# Patient Record
(demographics unavailable — no encounter records)

---

## 2025-07-14 NOTE — DISCUSSION/SUMMARY
[FreeTextEntry1] : 47 year old with fibroid uterus and anemia, recent MRI cannot r/o leiomyosarcoma  I discussed management options with Ms. FRYE in detail.    The risk of uterine sarcoma in women undergoing hysterectomy for fibroids is approximately 1 in 300-500 and the incidence of uterine sarcoma is low.  However, the prognosis of uterine sarcoma is poor given aggressive histology and high rate of relapse.    We discussed the options of definitive hysterectomy vs close surveillance.   Risks of surgery include bleeding/blood transfusion/infection/injury to bowel/bladder/ureter/blood vessels.  Due to size of uterus, minimally invasive hysterectomy is not feasible.  In addition, with her prior surgical history of myomectomy there is high likelihood of intra-abdominal adhesions which increased surgical risk.   Alternative to surgery includes continued observation with surveillance imaging.  Risks of observation include disease progression.    I answered her questions to her apparent satisfaction.  She will call office with her decision.  She is leaning towards surveillance, plan for f/u MRI in 3 months

## 2025-07-14 NOTE — PHYSICAL EXAM
[Chaperoned Physical Exam] : A chaperone was present in the examining room during all aspects of the physical examination. [MA] : MA [Abnormal] : Bimanual Exam: Abnormal [Normal] : Anus and perineum: Normal sphincter tone, no masses, no prolapse. [FreeTextEntry2] : Sunil [de-identified] : abdominoplasty scar [de-identified] : large fibroid posteriorly [de-identified] : extrinsic compression

## 2025-07-14 NOTE — HISTORY OF PRESENT ILLNESS
[FreeTextEntry1] : Referred by (GYN) Dr. Sosa   Ms. FRYE is a nulligravida 47 year old female, referred from Dr. Sosa, for a concerning fibroid.  She has a known history of fibroids, and is s/p myomectomy from 2017.   4/2025- TVUS- The endometrium poorly visualized.  The uterus is enlarged with lobular in appearance partially visualized measuring up to 16 x 8.2 x 11 cm.  There are suspected ill defined masses likely fibroids. Otherwise ovaries are WNL  6/2025- MRI Abdomen and Pelvis-   Mild iron overload is identified in the liver, focal biliary dilatation in segment 4 may be a small stricture   Uterus measuring 16.4 x 15 x 12.4 cm, post myomectomy changes, there are multiple fibroids there is a 5.1 cm right uterine body fibroid which exhibits diffusion restriction, hypercellular or LMS can not be excluded  6/30/25- D&C- 1. EMA -   Secretory endometrium 2. EMC -   Fragments of endometrial tissue with features of endometrial polyp -   Mildly inflamed endocervical mucosa with squamous metaplasia 3.  Fibroid uterus -   Fragment of benign smooth muscle tissue  PMHx- Fe deficiency anemia  PSHx- Gastric sleeve, ex-lap for myomectomy, abdominoplasty  Family hx of cancer- none  Of note she lost 170 pounds over the course of 14 years.   She denies pelvic pain/pressure. She denies bloating, abdominal distention or change in bowel or urinary habits.  She denies recent weight changes.  She denies nausea/vomiting.  She has regular menses, that are heavy for 2-3 days.  She denies all other associated signs and symptoms.  She is here today to discuss further surgical management.   HM Pap- 5/2025- negative, HRHPV (-) Mammo- 6/2025 Colonoscopy- 3/2023- negative

## 2025-07-14 NOTE — PHYSICAL EXAM
[Chaperoned Physical Exam] : A chaperone was present in the examining room during all aspects of the physical examination. [MA] : MA [Abnormal] : Bimanual Exam: Abnormal [Normal] : Anus and perineum: Normal sphincter tone, no masses, no prolapse. [FreeTextEntry2] : Sunil [de-identified] : abdominoplasty scar [de-identified] : large fibroid posteriorly [de-identified] : extrinsic compression

## 2025-07-14 NOTE — PHYSICAL EXAM
[Chaperoned Physical Exam] : A chaperone was present in the examining room during all aspects of the physical examination. [MA] : MA [Abnormal] : Bimanual Exam: Abnormal [Normal] : Anus and perineum: Normal sphincter tone, no masses, no prolapse. [FreeTextEntry2] : Sunil [de-identified] : abdominoplasty scar [de-identified] : large fibroid posteriorly [de-identified] : extrinsic compression

## 2025-07-14 NOTE — HISTORY OF PRESENT ILLNESS
[FreeTextEntry1] : Referred by (GYN) Dr. Sosa   Ms. FRYE is a nulligravida 47 year old female, referred from Dr. Soas, for a concerning fibroid.  She has a known history of fibroids, and is s/p myomectomy from 2017.   4/2025- TVUS- The endometrium poorly visualized.  The uterus is enlarged with lobular in appearance partially visualized measuring up to 16 x 8.2 x 11 cm.  There are suspected ill defined masses likely fibroids. Otherwise ovaries are WNL  6/2025- MRI Abdomen and Pelvis-   Mild iron overload is identified in the liver, focal biliary dilatation in segment 4 may be a small stricture   Uterus measuring 16.4 x 15 x 12.4 cm, post myomectomy changes, there are multiple fibroids there is a 5.1 cm right uterine body fibroid which exhibits diffusion restriction, hypercellular or LMS can not be excluded  6/30/25- D&C- 1. EMA -   Secretory endometrium 2. EMC -   Fragments of endometrial tissue with features of endometrial polyp -   Mildly inflamed endocervical mucosa with squamous metaplasia 3.  Fibroid uterus -   Fragment of benign smooth muscle tissue  PMHx- Fe deficiency anemia  PSHx- Gastric sleeve, ex-lap for myomectomy, abdominoplasty  Family hx of cancer- none  Of note she lost 170 pounds over the course of 14 years.   She denies pelvic pain/pressure. She denies bloating, abdominal distention or change in bowel or urinary habits.  She denies recent weight changes.  She denies nausea/vomiting.  She has regular menses, that are heavy for 2-3 days.  She denies all other associated signs and symptoms.  She is here today to discuss further surgical management.   HM Pap- 5/2025- negative, HRHPV (-) Mammo- 6/2025 Colonoscopy- 3/2023- negative